# Patient Record
Sex: MALE
[De-identification: names, ages, dates, MRNs, and addresses within clinical notes are randomized per-mention and may not be internally consistent; named-entity substitution may affect disease eponyms.]

---

## 2023-01-17 ENCOUNTER — FORM ENCOUNTER (OUTPATIENT)
Age: 88
End: 2023-01-17

## 2023-01-17 PROBLEM — Z00.00 ENCOUNTER FOR PREVENTIVE HEALTH EXAMINATION: Status: ACTIVE | Noted: 2023-01-17

## 2023-01-24 ENCOUNTER — APPOINTMENT (OUTPATIENT)
Dept: ORTHOPEDIC SURGERY | Facility: CLINIC | Age: 88
End: 2023-01-24

## 2023-01-30 ENCOUNTER — APPOINTMENT (OUTPATIENT)
Dept: ORTHOPEDIC SURGERY | Facility: CLINIC | Age: 88
End: 2023-01-30
Payer: MEDICARE

## 2023-01-30 VITALS — HEIGHT: 72 IN | BODY MASS INDEX: 27.09 KG/M2 | WEIGHT: 200 LBS

## 2023-01-30 DIAGNOSIS — I10 ESSENTIAL (PRIMARY) HYPERTENSION: ICD-10-CM

## 2023-01-30 DIAGNOSIS — Z78.9 OTHER SPECIFIED HEALTH STATUS: ICD-10-CM

## 2023-01-30 DIAGNOSIS — F17.200 NICOTINE DEPENDENCE, UNSPECIFIED, UNCOMPLICATED: ICD-10-CM

## 2023-01-30 DIAGNOSIS — E78.00 PURE HYPERCHOLESTEROLEMIA, UNSPECIFIED: ICD-10-CM

## 2023-01-30 PROCEDURE — 99204 OFFICE O/P NEW MOD 45 MIN: CPT

## 2023-01-30 PROCEDURE — 72100 X-RAY EXAM L-S SPINE 2/3 VWS: CPT

## 2023-01-30 RX ORDER — LOSARTAN POTASSIUM 100 MG/1
TABLET, FILM COATED ORAL
Refills: 0 | Status: ACTIVE | COMMUNITY

## 2023-01-30 RX ORDER — ATORVASTATIN CALCIUM 80 MG/1
TABLET, FILM COATED ORAL
Refills: 0 | Status: ACTIVE | COMMUNITY

## 2023-01-30 NOTE — PHYSICAL EXAM
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

## 2023-01-30 NOTE — DISCUSSION/SUMMARY
[de-identified] : Options were discussed in length including NSAIDS, anti-inflammatories, epidural injections, oral steroids, physical therapy, or MRI. \par Recommend MRI of lumbar spine to evaluate for left lower extremity weakness\par Discussed epidural injections to decrease inflammation around nerve\par Recommended physical therapy for strengthening. pt declined at this time. Recommended exercises and stretches to do at home including exercises with resistive bands, toe raises to work the calf, and leg extensions. We will discuss going to PT after MRI\par Patient will f/u after MRI for results and further evaluation \par \par Entered by Cecille King acting as Scribe. \par Dr. Dang Attestation\par The documentation recorded by the scribe, in my presence, accurately reflects the service I, Dr. Dang, personally performed, and the decisions made by me with my edits as appropriate.\par

## 2023-01-30 NOTE — HISTORY OF PRESENT ILLNESS
[de-identified] : 88M c/o left posterior thigh pain which radiates down into the foot since Summer 2022. Patient reports he was falling down some stairs when he grabbed himself and stopped it. Admits to having pain in the low back, pain radiates down the left leg into the foot. Denies outside imaging/treatment. Standing too long causes pain, sitting causes pain. laying causes pain. Has tried OTC remedies w/o relief.

## 2023-02-04 ENCOUNTER — RESULT REVIEW (OUTPATIENT)
Age: 88
End: 2023-02-04

## 2023-02-20 ENCOUNTER — APPOINTMENT (OUTPATIENT)
Dept: ORTHOPEDIC SURGERY | Facility: CLINIC | Age: 88
End: 2023-02-20
Payer: MEDICARE

## 2023-02-20 DIAGNOSIS — M47.816 SPONDYLOSIS W/OUT MYELOPATHY OR RADICULOPATHY, LUMBAR REGION: ICD-10-CM

## 2023-02-20 DIAGNOSIS — M48.061 SPINAL STENOSIS, LUMBAR REGION WITHOUT NEUROGENIC CLAUDICATION: ICD-10-CM

## 2023-02-20 DIAGNOSIS — M54.16 RADICULOPATHY, LUMBAR REGION: ICD-10-CM

## 2023-02-20 PROCEDURE — 99214 OFFICE O/P EST MOD 30 MIN: CPT

## 2023-02-20 NOTE — HISTORY OF PRESENT ILLNESS
[Lower back] : lower back [6] : 6 [Radiating] : radiating [Sharp] : sharp [Intermittent] : intermittent [Sleep] : sleep [Rest] : rest [Meds] : meds [Standing] : standing [Walking] : walking [Stairs] : stairs [de-identified] : 88M c/o left posterior thigh pain which radiates down into the foot since Summer 2022. Patient reports he was falling down some stairs when he grabbed himself and stopped it. Admits to having pain in the low back, pain radiates down the left leg into the foot. Denies outside imaging/treatment. Standing too long causes pain, sitting causes pain. laying causes pain. Has tried OTC remedies w/o relief. \par \par 02/20/23; F/U Pt is here today for his lower back. Pt states his pain is about the same as last visit. States he has been taking Advil for the pain. States he has radiating pain going down his Lt leg along with numbness to his Lt foot. States he had an MRI at .  [] : no [FreeTextEntry7] : Lt leg

## 2023-02-20 NOTE — PHYSICAL EXAM
[3___] : left hamstring 3[unfilled]/5 [2___] : left extensor hallicus longus 2[unfilled]/5 [] : negative sitting straight leg raise [FreeTextEntry3] : LLE thigh and calf atrophy  [de-identified] : absent reflex in left leg \par

## 2023-02-20 NOTE — DISCUSSION/SUMMARY
[de-identified] : The MRI images and impressions of lumbar spine were reviewed thoroughly and discussed. All questions were answered. \par Recommended patient go to physical therapy - script provided\par Recommend quad strengthening exercises. recommend he continue exercises with resistive bands, toe raises to work the calf, and leg extensions. \par f/u prn \par \par Entered by Cecille King acting as Scribe. \par Dr. Dang Attestation\par The documentation recorded by the scribe, in my presence, accurately reflects the service I, Dr. Dang, personally performed, and the decisions made by me with my edits as appropriate.\par

## 2023-02-20 NOTE — DATA REVIEWED
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

## 2023-03-22 ENCOUNTER — FORM ENCOUNTER (OUTPATIENT)
Age: 88
End: 2023-03-22

## 2023-04-05 ENCOUNTER — FORM ENCOUNTER (OUTPATIENT)
Age: 88
End: 2023-04-05